# Patient Record
(demographics unavailable — no encounter records)

---

## 2024-10-08 NOTE — PROCEDURE
[Large Joint Injection] : Large joint injection [Right] : of the right [Knee] : knee [Pain] : pain [Inflammation] : inflammation [X-ray evidence of Osteoarthritis on this or prior visit] : x-ray evidence of Osteoarthritis on this or prior visit [Alcohol] : alcohol [Betadine] : betadine [Ethyl Chloride sprayed topically] : ethyl chloride sprayed topically [Sterile technique used] : sterile technique used [___ cc    1%] : Lidocaine ~Vcc of 1%  [___ cc    0.25%] : Bupivacaine (Marcaine) ~Vcc of 0.25%  [___ cc    80mg] : Methylprednisolone (Depomedrol) ~Vcc of 80 mg  [] : Patient tolerated procedure well [Call if redness, pain or fever occur] : call if redness, pain or fever occur [Previous OTC use and PT nontherapeutic] : patient has tried OTC's including aspirin, Ibuprofen, Aleve, etc or prescription NSAIDS, and/or exercises at home and/or physical therapy without satisfactory response [Patient had decreased mobility in the joint] : patient had decreased mobility in the joint [Risks, benefits, alternatives discussed / Verbal consent obtained] : the risks benefits, and alternatives have been discussed, and verbal consent was obtained [Altered anatomic landmarks d/t erosive arthritis] : altered anatomic landmarks d/t erosive arthritis [All ultrasound images have been permanently captured and stored accordingly in our picture archiving and communication system] : All ultrasound images have been permanently captured and stored accordingly in our picture archiving and communication system [Visualization of the needle and placement of injection was performed without complication] : visualization of the needle and placement of injection was performed without complication

## 2024-10-09 NOTE — HISTORY OF PRESENT ILLNESS
[Work related] : work related [6] : 6 [Radiating] : radiating [Sharp] : sharp [Tightness] : tightness [Nothing helps with pain getting better] : Nothing helps with pain getting better [Standing] : standing [Walking] : walking [Full time] : Work status: full time [] : yes [FreeTextEntry3] : 11/15/23 [FreeTextEntry5] : 38 y/o F presents for WCFU eval of the Rt. knee today. Starting Euflexxa series today. 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

## 2024-10-09 NOTE — HISTORY OF PRESENT ILLNESS
[Work related] : work related [6] : 6 [Radiating] : radiating [Sharp] : sharp [Tightness] : tightness [Nothing helps with pain getting better] : Nothing helps with pain getting better [Standing] : standing [Walking] : walking [Full time] : Work status: full time [] : yes [FreeTextEntry3] : 11/15/23 [FreeTextEntry5] : 38 y/o F presents for WCFU eval of the Rt. knee today. Starting Euflexxa series today. 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

## 2024-10-09 NOTE — HISTORY OF PRESENT ILLNESS
[Work related] : work related [6] : 6 [Radiating] : radiating [Sharp] : sharp [Tightness] : tightness [Nothing helps with pain getting better] : Nothing helps with pain getting better [Standing] : standing [Walking] : walking [Full time] : Work status: full time [] : yes [FreeTextEntry3] : 11/15/23 [FreeTextEntry5] : 40 y/o F presents for WCFU eval of the Rt. knee today. Starting Euflexxa series today. 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

## 2024-10-09 NOTE — ASSESSMENT
[FreeTextEntry1] : The patient is here to begin HA injections for her right knee. She states her  stated that they are approved but we do not have paperwork to support this. She will call her  to get said paperwork sent over. She is unchanged otherwise. She continues to have pain in the right knee particular with walking.  She has some pain with stairs but denies night pain.  She had a cortisone shot in the recent past with minimal relief.  She is currently working without restrictions. The patient is awaiting gel injections. She has failed CSI and PT. She uses oral AIs as needed with mild relief.   Right knee exam: Neurovascularly intact. Sensation intact. Old arthroscopy scars intact and well healed. Ranging 0-120 with pain at EROM flexion. This is from an old Worker's Comp. injury more than 2 years ago.  She has 1+ effusion.  There is no redness or rashes.  There is no instability or apprehension and normal patellar tracking.  Under sterile conditions the right knee was injected with 20mg /2 cc Euflexxa.  They tolerated the procedure well.  Ultrasound guidance was used due to patient body habitus and for accuracy.  The patient received her first euflexxa injection for the right knee. Ultrasound guidance was used. She tolerated it well. She will return in one week. She has a 30% temporary partial disability to the right knee.  Her right knee pain is causally related to her work accident.  She will continue to work without restrictions.

## 2024-10-15 NOTE — PROCEDURE
[Large Joint Injection] : Large joint injection [Right] : of the right [Knee] : knee [Pain] : pain [Inflammation] : inflammation [X-ray evidence of Osteoarthritis on this or prior visit] : x-ray evidence of Osteoarthritis on this or prior visit [Alcohol] : alcohol [Betadine] : betadine [Ethyl Chloride sprayed topically] : ethyl chloride sprayed topically [Sterile technique used] : sterile technique used [___ cc    1%] : Lidocaine ~Vcc of 1%  [___ cc    0.25%] : Bupivacaine (Marcaine) ~Vcc of 0.25%  [___ cc    80mg] : Methylprednisolone (Depomedrol) ~Vcc of 80 mg  [] : Patient tolerated procedure well [Call if redness, pain or fever occur] : call if redness, pain or fever occur [Previous OTC use and PT nontherapeutic] : patient has tried OTC's including aspirin, Ibuprofen, Aleve, etc or prescription NSAIDS, and/or exercises at home and/or physical therapy without satisfactory response [Patient had decreased mobility in the joint] : patient had decreased mobility in the joint [Risks, benefits, alternatives discussed / Verbal consent obtained] : the risks benefits, and alternatives have been discussed, and verbal consent was obtained [Altered anatomic landmarks d/t erosive arthritis] : altered anatomic landmarks d/t erosive arthritis [All ultrasound images have been permanently captured and stored accordingly in our picture archiving and communication system] : All ultrasound images have been permanently captured and stored accordingly in our picture archiving and communication system [Visualization of the needle and placement of injection was performed without complication] : visualization of the needle and placement of injection was performed without complication [#2] : series #2

## 2024-10-16 NOTE — HISTORY OF PRESENT ILLNESS
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

## 2024-10-16 NOTE — HISTORY OF PRESENT ILLNESS
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

## 2024-10-16 NOTE — HISTORY OF PRESENT ILLNESS
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

## 2024-10-16 NOTE — ASSESSMENT
[FreeTextEntry1] : The patient is here to continue HA injections She states her  stated that they are approved but we do not have paperwork to support this. She will call her  to get said paperwork sent over. She is unchanged otherwise. She continues to have pain in the right knee particular with walking.  She has some pain with stairs but denies night pain.  She had a cortisone shot in the recent past with minimal relief.  She is currently working without restrictions. The patient is awaiting gel injections. She has failed CSI and PT. She uses oral AIs as needed with mild relief.   Right knee exam: Neurovascularly intact. Sensation intact. Old arthroscopy scars intact and well healed. Ranging 0-120 with pain at EROM flexion. This is from an old Worker's Comp. injury more than 2 years ago.  She has 1+ effusion.  There is no redness or rashes.  There is no instability or apprehension and normal patellar tracking.  The patient received her first euflexxa injection for the right knee. Ultrasound guidance was used. She tolerated it well. She will return in one week. She has a 30% temporary partial disability to the right knee.  Her right knee pain is causally related to her work accident.  She will continue to work without restrictions.

## 2024-10-22 NOTE — HISTORY OF PRESENT ILLNESS
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

## 2024-10-22 NOTE — HISTORY OF PRESENT ILLNESS
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

## 2024-10-22 NOTE — PROCEDURE
[Large Joint Injection] : Large joint injection [Right] : of the right [Knee] : knee [Pain] : pain [Inflammation] : inflammation [X-ray evidence of Osteoarthritis on this or prior visit] : x-ray evidence of Osteoarthritis on this or prior visit [Alcohol] : alcohol [Betadine] : betadine [Ethyl Chloride sprayed topically] : ethyl chloride sprayed topically [Sterile technique used] : sterile technique used [Euflexxa(20mg)] : 20mg of Euflexxa [#3] : series #3 [] : Patient tolerated procedure well [Call if redness, pain or fever occur] : call if redness, pain or fever occur [Previous OTC use and PT nontherapeutic] : patient has tried OTC's including aspirin, Ibuprofen, Aleve, etc or prescription NSAIDS, and/or exercises at home and/or physical therapy without satisfactory response [Patient had decreased mobility in the joint] : patient had decreased mobility in the joint [Risks, benefits, alternatives discussed / Verbal consent obtained] : the risks benefits, and alternatives have been discussed, and verbal consent was obtained [Altered anatomic landmarks d/t erosive arthritis] : altered anatomic landmarks d/t erosive arthritis [All ultrasound images have been permanently captured and stored accordingly in our picture archiving and communication system] : All ultrasound images have been permanently captured and stored accordingly in our picture archiving and communication system [Visualization of the needle and placement of injection was performed without complication] : visualization of the needle and placement of injection was performed without complication

## 2024-10-22 NOTE — ASSESSMENT
[FreeTextEntry1] : The patient is here to continue HA injections She states her  stated that they are approved but we do not have paperwork to support this. She will call her  to get said paperwork sent over. She is unchanged otherwise. She continues to have pain in the right knee particular with walking.  She has some pain with stairs but denies night pain.  She had a cortisone shot in the recent past with minimal relief.  She is currently working without restrictions. The patient is awaiting gel injections. She has failed CSI and PT. She uses oral AIs as needed with mild relief.   Right knee exam: Neurovascularly intact. Sensation intact. Old arthroscopy scars intact and well healed. Ranging 0-120 with pain at EROM flexion. This is from an old Worker's Comp. injury more than 2 years ago.  She has 1+ effusion.  There is no redness or rashes.  There is no instability or apprehension and normal patellar tracking.  The patient received her third euflexxa injection for the right knee. Ultrasound guidance was used. She tolerated it well. She will return in 3 months. She has a 30% temporary partial disability to the right knee.  Her right knee pain is causally related to her work accident.  She will continue to work without restrictions.

## 2025-01-29 NOTE — HISTORY OF PRESENT ILLNESS
[Work related] : work related [6] : 6 [Radiating] : radiating [Sharp] : sharp [Tightness] : tightness [Injection therapy] : injection therapy [Standing] : standing [Walking] : walking [Full time] : Work status: full time [] : yes [FreeTextEntry3] : 11/15/23 [FreeTextEntry5] : 40 y/o F presents for WCFU eval today. Previous tx Euflexxa with relief.  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

## 2025-01-29 NOTE — ASSESSMENT
[FreeTextEntry1] : The patient is here for right knee pain. She reports that the gel injections helped her pain moderately. She denies new trauma. She continues to have pain in the right knee particular with walking.  She has some pain with stairs but denies night pain.  She had a cortisone shot in the recent past with minimal relief.  She is currently working without restrictions.  Right knee exam: Neurovascularly intact. Sensation intact. Old arthroscopy scars intact and well healed. Ranging 0-120 with pain at EROM flexion. This is from an old Worker's Comp. injury more than 2 years ago.  She has 1+ effusion.  There is no redness or rashes.  There is no instability or apprehension and normal patellar tracking.  The patient will use voltaren gel for her medial knee pain. She is happy with the success of her gel injections. She does not feel that her pain is bad enough for a CSI. She will return in 3 months. She has a 30% temporary partial disability to the right knee.  Her right knee pain is causally related to her work accident.  She will continue to work without restrictions.

## 2025-03-24 NOTE — HISTORY OF PRESENT ILLNESS
[Work related] : work related [6] : 6 [Radiating] : radiating [Sharp] : sharp [Tightness] : tightness [Injection therapy] : injection therapy [Standing] : standing [Walking] : walking [Full time] : Work status: full time [] : yes [FreeTextEntry3] : 11/15/23 [FreeTextEntry5] : 40 y/o F presents for WCFU eval today.  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

## 2025-03-24 NOTE — HISTORY OF PRESENT ILLNESS
[Work related] : work related [6] : 6 [Radiating] : radiating [Sharp] : sharp [Tightness] : tightness [Injection therapy] : injection therapy [Standing] : standing [Walking] : walking [Full time] : Work status: full time [] : yes [FreeTextEntry3] : 11/15/23 [FreeTextEntry5] : 40 y/o F presents for WCFU eval today.  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

## 2025-03-24 NOTE — ASSESSMENT
[FreeTextEntry1] : The patient comes in today for follow-up on her right knee.  She continues to have some discomfort in the right knee from her work-related accident on November 15, 2023.  She has had cortisone and gel injections with good temporary relief.  Her last Euflexxa injection on October 21, 2024 still helping.  She is currently working without restrictions.  She has moderate discomfort with prolonged standing and difficulty with stairs and gets occasional pain at night.  She has only mild pain with walking distances.  Examination of the right lower extremity reveals normal neurovascular exam.  Examination of the right knee reveals limited range of motion from 0 to 125 degrees with a trace effusion.  There is significant patellofemoral crepitation and patellofemoral tenderness.  There is no joint line pain or Aura's sign.  There is no instability or apprehension.  The plan at this point is that the patient has reached maximal medical improvement to the right knee with a scheduled loss of use of 17-1/2%.  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  She will continue to work without restrictions.  I will see her back in the office as necessary.  She may need Euflexxa injections in the future as well.